# Patient Record
Sex: MALE | Race: WHITE | NOT HISPANIC OR LATINO | Employment: OTHER | URBAN - NONMETROPOLITAN AREA
[De-identification: names, ages, dates, MRNs, and addresses within clinical notes are randomized per-mention and may not be internally consistent; named-entity substitution may affect disease eponyms.]

---

## 2021-07-14 ENCOUNTER — OFFICE VISIT (OUTPATIENT)
Dept: CARDIOLOGY | Facility: CLINIC | Age: 71
End: 2021-07-14

## 2021-07-14 VITALS
SYSTOLIC BLOOD PRESSURE: 148 MMHG | HEIGHT: 73 IN | WEIGHT: 195 LBS | DIASTOLIC BLOOD PRESSURE: 88 MMHG | RESPIRATION RATE: 12 BRPM | HEART RATE: 88 BPM | BODY MASS INDEX: 25.84 KG/M2 | TEMPERATURE: 97 F | OXYGEN SATURATION: 99 %

## 2021-07-14 DIAGNOSIS — Z72.0 TOBACCO USE: ICD-10-CM

## 2021-07-14 DIAGNOSIS — I48.20 ATRIAL FIBRILLATION, CHRONIC (HCC): Primary | ICD-10-CM

## 2021-07-14 PROCEDURE — 99203 OFFICE O/P NEW LOW 30 MIN: CPT | Performed by: INTERNAL MEDICINE

## 2021-07-14 PROCEDURE — 93000 ELECTROCARDIOGRAM COMPLETE: CPT | Performed by: INTERNAL MEDICINE

## 2021-07-14 RX ORDER — METOPROLOL SUCCINATE 25 MG/1
25 TABLET, EXTENDED RELEASE ORAL DAILY
Qty: 90 TABLET | Refills: 3 | Status: SHIPPED | OUTPATIENT
Start: 2021-07-14 | End: 2022-07-25 | Stop reason: SDUPTHER

## 2021-07-14 NOTE — PROGRESS NOTES
[facility] [place]  [facility] CARDIOLOGY  [address] [place] [address]  Dept: [phone]  Dept Fax: [phone]    Ray Washington  1950    Follow Up Office Visit Note    History of Present Illness:  Ray Washington is a 70 y.o. male who presents to the clinic for new patient. Chronic atrial fibrillation- - He is 70 years old, with Chronic atrial fibrillation, on toprol xl 25mg and Xarelto 20 mg daily, denies any chest pain, SOB, no edema, no palpitations, BP is 130.80 EKG in AF with HR 68. ,, will keep same meds, his cardiac exam seems normal except irregular irregular, he smokes, few daily., advised to quit    The following portions of the patient's history were reviewed and updated as appropriate: allergies, current medications, past family history, past medical history, past social history, past surgical history and problem list.    Medications:  folic acid  metoprolol succinate XL  rivaroxaban  vitamin B-12    Subjective  No Known Allergies     Past Medical History:   Diagnosis Date   • Actinic keratosis    • Benign prostatic hyperplasia     UNSPECIFIED WHETHER LOWER URINARY TRACT SYMPTOMS PRESENT   • Body mass index (BMI) of 25.0 to 25.9 in adult    • Corneal abrasion    • History of basal cell carcinoma (BCC) excision    • Low back pain    • Mixed hyperlipidemia    • New onset a-fib (CMS/HCC)    • Primary osteoarthritis of left hip    • Right epiphora    • Tobacco abuse    • Watering of eye        Past Surgical History:   Procedure Laterality Date   • ARTHROPLASTY      LEFT TOTAL HIP   • COLONOSCOPY         Family History   Problem Relation Age of Onset   • Hypertension Mother    • Diabetes Father    • Hypertension Father    • Diabetes Sister    • Hypertension Brother    • Diabetes Brother         Social History     Socioeconomic History   • Marital status:      Spouse name: Not on file   • Number of children: Not on file   • Years of education: Not on file   • Highest  "education level: Not on file   Tobacco Use   • Smoking status: Smoker, Current Status Unknown     Packs/day: 0.25     Types: Cigarettes   • Smokeless tobacco: Never Used   Vaping Use   • Vaping Use: Never used   Substance and Sexual Activity   • Alcohol use: Yes   • Drug use: Never   • Sexual activity: Defer       Review of Systems   All other systems reviewed and are negative.      Cardiovascular Procedures    ECHO/MUGA:   STRESS TESTS:   CARDIAC CATH:   DEVICES:   HOLTER:   CT/MRI:   VASCULAR:   CARDIOTHORACIC:     Objective  Vitals:    07/14/21 1506   BP: 148/88   BP Location: Left arm   Patient Position: Sitting   Cuff Size: Adult   Pulse: 88   Resp: 12   Temp: 97 °F (36.1 °C)   TempSrc: Infrared   SpO2: 99%   Weight: 88.5 kg (195 lb)   Height: 185.4 cm (73\")   PainSc: 0-No pain     Body mass index is 25.73 kg/m².     Physical Exam  Constitutional:       Appearance: Healthy appearance. Not in distress.   Neck:      Vascular: No JVR. JVD normal.   Pulmonary:      Effort: Pulmonary effort is normal.      Breath sounds: Normal breath sounds. No wheezing. No rhonchi. No rales.   Chest:      Chest wall: Not tender to palpatation.   Cardiovascular:      PMI at left midclavicular line. Normal rate. Irregularly irregular rhythm. Normal S1. Normal S2.      Murmurs: There is no murmur.      No gallop. No click. No rub.   Pulses:     Intact distal pulses.   Edema:     Peripheral edema absent.   Abdominal:      General: Bowel sounds are normal.      Palpations: Abdomen is soft.      Tenderness: There is no abdominal tenderness.   Musculoskeletal: Normal range of motion.         General: No tenderness. Skin:     General: Skin is warm and dry.   Neurological:      General: No focal deficit present.      Mental Status: Alert and oriented to person, place and time.          Diagnostic Data    ECG 12 Lead    Date/Time: 7/14/2021 3:59 PM  Performed by: Rony Hernadez MD  Authorized by: Rony Hernadez MD "   Comparison: compared with previous ECG   Similar to previous ECG  Rhythm: atrial fibrillation  Rate: normal  BPM: 68  Conduction: left anterior fascicular block  QRS axis: left    Clinical impression: abnormal EKG            Assessment and Plan  Diagnoses and all orders for this visit:    Atrial fibrillation, chronic (CMS/HCC)-rate control, doing good on Toprol xl 25mg, and Xarelto 20 mg.  Tobacco abuser- Advised to quit smoking         Return in about 6 months (around 1/14/2022) for Recheck.    Rony Hernadez MD  07/14/2021

## 2022-01-25 ENCOUNTER — OFFICE VISIT (OUTPATIENT)
Dept: CARDIOLOGY | Facility: CLINIC | Age: 72
End: 2022-01-25

## 2022-01-25 VITALS
HEART RATE: 96 BPM | SYSTOLIC BLOOD PRESSURE: 132 MMHG | DIASTOLIC BLOOD PRESSURE: 80 MMHG | BODY MASS INDEX: 25.98 KG/M2 | OXYGEN SATURATION: 99 % | HEIGHT: 73 IN | WEIGHT: 196 LBS | TEMPERATURE: 97 F | RESPIRATION RATE: 12 BRPM

## 2022-01-25 DIAGNOSIS — I48.20 ATRIAL FIBRILLATION, CHRONIC: Primary | ICD-10-CM

## 2022-01-25 DIAGNOSIS — Z72.0 TOBACCO USE: ICD-10-CM

## 2022-01-25 PROCEDURE — 99213 OFFICE O/P EST LOW 20 MIN: CPT | Performed by: INTERNAL MEDICINE

## 2022-01-25 NOTE — PROGRESS NOTES
[facility] [place]  [facility] CARDIOLOGY  [address] [place] [address]  Dept: [phone]  Dept Fax: [phone]    Ray Washington  1950    Follow Up Office Visit Note    History of Present Illness:  Ray Washington is a 71 y.o. male who presents to the clinic for Follow-up.CAF- He seems doing well, no complaints, on Toprol xl 25 mg HR 80, and also Xarelto 20 mg, his creatinine last year was 1.3 will get some lab .    The following portions of the patient's history were reviewed and updated as appropriate: allergies, current medications, past family history, past medical history, past social history, past surgical history and problem list.    Medications:  folic acid  metoprolol succinate XL  rivaroxaban  vitamin B-12    Subjective  No Known Allergies     Past Medical History:   Diagnosis Date   • Actinic keratosis    • Benign prostatic hyperplasia     UNSPECIFIED WHETHER LOWER URINARY TRACT SYMPTOMS PRESENT   • Body mass index (BMI) of 25.0 to 25.9 in adult    • Corneal abrasion    • History of basal cell carcinoma (BCC) excision    • Low back pain    • Mixed hyperlipidemia    • New onset a-fib (HCC)    • Primary osteoarthritis of left hip    • Right epiphora    • Tobacco abuse    • Watering of eye        Past Surgical History:   Procedure Laterality Date   • ARTHROPLASTY      LEFT TOTAL HIP   • COLONOSCOPY         Family History   Problem Relation Age of Onset   • Hypertension Mother    • Diabetes Father    • Hypertension Father    • Diabetes Sister    • Hypertension Brother    • Diabetes Brother         Social History     Socioeconomic History   • Marital status:    Tobacco Use   • Smoking status: Smoker, Current Status Unknown     Packs/day: 0.25     Types: Cigarettes   • Smokeless tobacco: Never Used   Vaping Use   • Vaping Use: Never used   Substance and Sexual Activity   • Alcohol use: Yes   • Drug use: Never   • Sexual activity: Defer       Review of Systems  "  Constitutional: Negative.    HENT: Negative.    Respiratory: Negative.    Cardiovascular: Negative.    Endocrine: Negative.    Genitourinary: Negative.    Musculoskeletal: Negative.    Skin: Negative.    Allergic/Immunologic: Negative.    Neurological: Negative.    Hematological: Negative.    Psychiatric/Behavioral: Negative.    All other systems reviewed and are negative.      Cardiovascular Procedures    ECHO/MUGA:   STRESS TESTS:   CARDIAC CATH:   DEVICES:   HOLTER:   CT/MRI:   VASCULAR:   CARDIOTHORACIC:     Objective  Vitals:    01/25/22 1450   BP: 132/80   BP Location: Left arm   Patient Position: Lying   Cuff Size: Adult   Pulse: 96   Resp: 12   Temp: 97 °F (36.1 °C)   TempSrc: Infrared   SpO2: 99%   Weight: 88.9 kg (196 lb)   Height: 185.4 cm (73\")   PainSc: 0-No pain     Body mass index is 25.86 kg/m².     Physical Exam  Constitutional:       Appearance: Healthy appearance. Not in distress.   Neck:      Vascular: No JVR. JVD normal.   Pulmonary:      Effort: Pulmonary effort is normal.      Breath sounds: Normal breath sounds. No wheezing. No rhonchi. No rales.   Chest:      Chest wall: Not tender to palpatation.   Cardiovascular:      PMI at left midclavicular line. Normal rate. Irregularly irregular rhythm. Normal S1. Normal S2.      Murmurs: There is no murmur.      No gallop. No click. No rub.   Pulses:     Intact distal pulses.   Edema:     Peripheral edema absent.   Abdominal:      General: Bowel sounds are normal.      Palpations: Abdomen is soft.      Tenderness: There is no abdominal tenderness.   Musculoskeletal: Normal range of motion.         General: No tenderness. Skin:     General: Skin is warm and dry.   Neurological:      General: No focal deficit present.      Mental Status: Alert and oriented to person, place and time.          Diagnostic Data  Procedures    Assessment and Plan  Diagnoses and all orders for this visit:    Atrial fibrillation, chronic (HCC)- Rate control on Toprol xl 25 " mg, and also Xarelto 20 mg  -     CBC & Differential  -     Comprehensive Metabolic Panel    Tobacco use- advised to quit         Return in about 6 months (around 7/25/2022) for Recheck.    Rony Hernadez MD  01/25/2022

## 2022-01-25 NOTE — PROGRESS NOTES
"[facility] [place]  [facility] CARDIOLOGY  [address] [place] [address]  Dept: [phone]  Dept Fax: [phone]    Ray Washington  1950    Follow Up Office Visit Note    History of Present Illness:  Ray Washington is a 71 y.o. male who presents to the clinic for Follow-up Chronic atrial fibrillation, asymptomatic,     The following portions of the patient's history were reviewed and updated as appropriate: {history reviewed:20406::\"allergies\",\"current medications\",\"past family history\",\"past medical history\",\"past social history\",\"past surgical history\",\"problem list\"}.    Medications:  folic acid  metoprolol succinate XL  rivaroxaban  vitamin B-12    Subjective  No Known Allergies     Past Medical History:   Diagnosis Date   • Actinic keratosis    • Benign prostatic hyperplasia     UNSPECIFIED WHETHER LOWER URINARY TRACT SYMPTOMS PRESENT   • Body mass index (BMI) of 25.0 to 25.9 in adult    • Corneal abrasion    • History of basal cell carcinoma (BCC) excision    • Low back pain    • Mixed hyperlipidemia    • New onset a-fib (HCC)    • Primary osteoarthritis of left hip    • Right epiphora    • Tobacco abuse    • Watering of eye        Past Surgical History:   Procedure Laterality Date   • ARTHROPLASTY      LEFT TOTAL HIP   • COLONOSCOPY         Family History   Problem Relation Age of Onset   • Hypertension Mother    • Diabetes Father    • Hypertension Father    • Diabetes Sister    • Hypertension Brother    • Diabetes Brother         Social History     Socioeconomic History   • Marital status:    Tobacco Use   • Smoking status: Smoker, Current Status Unknown     Packs/day: 0.25     Types: Cigarettes   • Smokeless tobacco: Never Used   Vaping Use   • Vaping Use: Never used   Substance and Sexual Activity   • Alcohol use: Yes   • Drug use: Never   • Sexual activity: Defer       Review of Systems    Cardiovascular Procedures    ECHO/MUGA: ***  STRESS TESTS:   CARDIAC CATH: " "  DEVICES:   HOLTER:   CT/MRI:   VASCULAR:   CARDIOTHORACIC:     Objective  Vitals:    01/25/22 1450   BP: 132/80   BP Location: Left arm   Patient Position: Lying   Cuff Size: Adult   Pulse: 96   Resp: 12   Temp: 97 °F (36.1 °C)   TempSrc: Infrared   SpO2: 99%   Weight: 88.9 kg (196 lb)   Height: 185.4 cm (73\")   PainSc: 0-No pain     Body mass index is 25.86 kg/m².     Physical Exam  Physical Exam     Diagnostic Data  Procedures    Assessment and Plan  Diagnoses and all orders for this visit:    Atrial fibrillation, chronic (HCC)    Tobacco use         No follow-ups on file.    Rony Hernadez MD  01/25/2022  "

## 2022-01-26 ENCOUNTER — TELEPHONE (OUTPATIENT)
Dept: CARDIOLOGY | Facility: CLINIC | Age: 72
End: 2022-01-26

## 2022-01-26 LAB
ALBUMIN SERPL-MCNC: 4.1 G/DL (ref 3.7–4.7)
ALBUMIN/GLOB SERPL: 1.6 {RATIO} (ref 1.2–2.2)
ALP SERPL-CCNC: 51 IU/L (ref 44–121)
ALT SERPL-CCNC: 14 IU/L (ref 0–44)
AST SERPL-CCNC: 20 IU/L (ref 0–40)
BASOPHILS # BLD AUTO: 0 X10E3/UL (ref 0–0.2)
BASOPHILS NFR BLD AUTO: 0 %
BILIRUB SERPL-MCNC: 0.3 MG/DL (ref 0–1.2)
BUN SERPL-MCNC: 19 MG/DL (ref 8–27)
BUN/CREAT SERPL: 17 (ref 10–24)
CALCIUM SERPL-MCNC: 9 MG/DL (ref 8.6–10.2)
CHLORIDE SERPL-SCNC: 107 MMOL/L (ref 96–106)
CO2 SERPL-SCNC: 20 MMOL/L (ref 20–29)
CREAT SERPL-MCNC: 1.14 MG/DL (ref 0.76–1.27)
EOSINOPHIL # BLD AUTO: 0.3 X10E3/UL (ref 0–0.4)
EOSINOPHIL NFR BLD AUTO: 4 %
ERYTHROCYTE [DISTWIDTH] IN BLOOD BY AUTOMATED COUNT: 12 % (ref 11.6–15.4)
GLOBULIN SER CALC-MCNC: 2.5 G/DL (ref 1.5–4.5)
GLUCOSE SERPL-MCNC: 82 MG/DL (ref 65–99)
HCT VFR BLD AUTO: 43.4 % (ref 37.5–51)
HGB BLD-MCNC: 15.2 G/DL (ref 13–17.7)
IMM GRANULOCYTES # BLD AUTO: 0 X10E3/UL (ref 0–0.1)
IMM GRANULOCYTES NFR BLD AUTO: 0 %
LYMPHOCYTES # BLD AUTO: 1.9 X10E3/UL (ref 0.7–3.1)
LYMPHOCYTES NFR BLD AUTO: 28 %
MCH RBC QN AUTO: 32.8 PG (ref 26.6–33)
MCHC RBC AUTO-ENTMCNC: 35 G/DL (ref 31.5–35.7)
MCV RBC AUTO: 94 FL (ref 79–97)
MONOCYTES # BLD AUTO: 0.6 X10E3/UL (ref 0.1–0.9)
MONOCYTES NFR BLD AUTO: 8 %
NEUTROPHILS # BLD AUTO: 4.1 X10E3/UL (ref 1.4–7)
NEUTROPHILS NFR BLD AUTO: 60 %
PLATELET # BLD AUTO: 276 X10E3/UL (ref 150–450)
POTASSIUM SERPL-SCNC: 4.6 MMOL/L (ref 3.5–5.2)
PROT SERPL-MCNC: 6.6 G/DL (ref 6–8.5)
RBC # BLD AUTO: 4.63 X10E6/UL (ref 4.14–5.8)
SODIUM SERPL-SCNC: 140 MMOL/L (ref 134–144)
WBC # BLD AUTO: 6.8 X10E3/UL (ref 3.4–10.8)

## 2022-01-26 NOTE — TELEPHONE ENCOUNTER
----- Message from Rony Hernadez MD sent at 1/26/2022 12:48 PM EST -----  Lab are good including kidney function

## 2022-01-28 ENCOUNTER — TELEPHONE (OUTPATIENT)
Dept: CARDIOLOGY | Facility: CLINIC | Age: 72
End: 2022-01-28

## 2022-01-28 NOTE — TELEPHONE ENCOUNTER
Spoke with  Felix and advised him his lab work and kidney function were normal. He had no questions at this time and verbalized understanding.

## 2022-06-20 ENCOUNTER — OFFICE VISIT (OUTPATIENT)
Dept: FAMILY MEDICINE CLINIC | Facility: CLINIC | Age: 72
End: 2022-06-20

## 2022-06-20 VITALS
HEIGHT: 73 IN | SYSTOLIC BLOOD PRESSURE: 140 MMHG | OXYGEN SATURATION: 98 % | BODY MASS INDEX: 25.96 KG/M2 | DIASTOLIC BLOOD PRESSURE: 85 MMHG | WEIGHT: 195.9 LBS | HEART RATE: 66 BPM

## 2022-06-20 DIAGNOSIS — Z00.00 ROUTINE GENERAL MEDICAL EXAMINATION AT A HEALTH CARE FACILITY: Primary | ICD-10-CM

## 2022-06-20 DIAGNOSIS — I10 PRIMARY HYPERTENSION: ICD-10-CM

## 2022-06-20 DIAGNOSIS — I48.21 PERMANENT ATRIAL FIBRILLATION: ICD-10-CM

## 2022-06-20 DIAGNOSIS — E78.2 MIXED HYPERLIPIDEMIA: ICD-10-CM

## 2022-06-20 DIAGNOSIS — Z12.5 PROSTATE CANCER SCREENING: ICD-10-CM

## 2022-06-20 DIAGNOSIS — Z72.0 TOBACCO ABUSE: ICD-10-CM

## 2022-06-20 DIAGNOSIS — L57.0 ACTINIC KERATOSIS: ICD-10-CM

## 2022-06-20 PROCEDURE — 99397 PER PM REEVAL EST PAT 65+ YR: CPT | Performed by: FAMILY MEDICINE

## 2022-06-20 PROCEDURE — 99213 OFFICE O/P EST LOW 20 MIN: CPT | Performed by: FAMILY MEDICINE

## 2022-06-20 PROCEDURE — 36415 COLL VENOUS BLD VENIPUNCTURE: CPT | Performed by: FAMILY MEDICINE

## 2022-06-20 NOTE — PROGRESS NOTES
"Chief Complaint  Suspicious Skin Lesion and foot fungus    Subjective          Ray Washington presents to [facility] PRIMARY [facility]  Mr. Ray Washington comes in today he has some skin spots he wants to look at he wants his blood work done and states he would like to get his low-dose CT scan set up as well he denies any other diffuse problems at this point time he states he has quit smoking for some period of time and states otherwise he has been doing pretty good.      Objective   Vital Signs:   /85   Pulse 66   Ht 185.4 cm (73\")   Wt 88.9 kg (195 lb 14.4 oz)   SpO2 98%   BMI 25.85 kg/m²     Body mass index is 25.85 kg/m².    Review of Systems   Constitutional: Negative.    HENT: Negative for congestion, dental problem, ear discharge, ear pain and sore throat.    Respiratory: Positive for shortness of breath. Negative for apnea and chest tightness.    Gastrointestinal: Negative for constipation and nausea.   Endocrine: Negative for polyuria.   Genitourinary: Negative for difficulty urinating.   Musculoskeletal: Negative for arthralgias and gait problem.   Skin: Positive for skin lesions. Negative for rash.   Hematological: Negative for adenopathy.       Past History:  Medical History: has a past medical history of Actinic keratosis, Benign prostatic hyperplasia, Body mass index (BMI) of 25.0 to 25.9 in adult, Corneal abrasion, History of basal cell carcinoma (BCC) excision, Low back pain, Mixed hyperlipidemia, New onset a-fib (HCC), Primary osteoarthritis of left hip, Right epiphora, Tobacco abuse, and Watering of eye.   Surgical History: has a past surgical history that includes Arthroplasty and Colonoscopy.         Current Outpatient Medications:   •  metoprolol succinate XL (TOPROL-XL) 25 MG 24 hr tablet, Take 1 tablet by mouth Daily., Disp: 90 tablet, Rfl: 3  •  rivaroxaban (XARELTO) 20 MG tablet, Take 1 tablet by mouth Daily With Dinner., Disp: 90 tablet, Rfl: 3    Allergies: Patient has " no known allergies.    Physical Exam  Constitutional:       Appearance: Normal appearance.   HENT:      Head: Normocephalic.      Right Ear: Tympanic membrane, ear canal and external ear normal.      Left Ear: Tympanic membrane, ear canal and external ear normal.      Nose: Nose normal.      Mouth/Throat:      Pharynx: Oropharynx is clear.   Eyes:      Pupils: Pupils are equal, round, and reactive to light.   Cardiovascular:      Rate and Rhythm: Normal rate. Rhythm irregular.      Pulses: Normal pulses.   Pulmonary:      Effort: Pulmonary effort is normal.      Breath sounds: Normal breath sounds.   Abdominal:      General: Abdomen is flat. Bowel sounds are normal.      Palpations: Abdomen is soft.   Musculoskeletal:         General: Normal range of motion.   Skin:     General: Skin is warm and dry.      Comments: Patient has an actinic keratosis on the right side of his chest and also has 1 on his right shoulder at this time   Neurological:      General: No focal deficit present.      Mental Status: He is alert and oriented to person, place, and time.          Result Review :                   Assessment and Plan    Diagnoses and all orders for this visit:    1. Routine general medical examination at a health care facility (Primary)  Comments:  We will continue his present medications we will get a low-dose CT scan ordered as well    2. Primary hypertension  Comments:  Stable  Orders:  -     Comprehensive Metabolic Panel; Future  -     CBC & Differential; Future  -     Comprehensive Metabolic Panel  -     CBC & Differential    3. Actinic keratosis  Comments:  Treated to with liquid nitrogen    4. Mixed hyperlipidemia  Comments:  Blood work and monitor  Orders:  -     Lipid Panel; Future  -     Lipid Panel    5. Tobacco abuse  Comments:  As [id number] history we will get low-dose CT scan    6. Prostate cancer screening  Comments:  We will get PSA  Orders:  -     PSA Screen; Future  -     PSA Screen    7.  Permanent atrial fibrillation (HCC)  Comments:  Has been seeing cardiology but not recently            Updated annual wellness visit checklist.  Immunizations discussed.  Screening up-to-date.  Recommend yearly dental and eye exams. Also discussed monitoring of blood pressure and lipids.    Follow Up   No follow-ups on file.  Patient was given instructions and counseling regarding his condition or for health maintenance advice. Please see specific information pulled into the AVS if appropriate.     Brian Reese MD

## 2022-06-21 LAB
ALBUMIN SERPL-MCNC: 4.5 G/DL (ref 3.7–4.7)
ALBUMIN/GLOB SERPL: 1.7 {RATIO} (ref 1.2–2.2)
ALP SERPL-CCNC: 69 IU/L (ref 44–121)
ALT SERPL-CCNC: 14 IU/L (ref 0–44)
AST SERPL-CCNC: 20 IU/L (ref 0–40)
BASOPHILS # BLD AUTO: 0 X10E3/UL (ref 0–0.2)
BASOPHILS NFR BLD AUTO: 0 %
BILIRUB SERPL-MCNC: 0.3 MG/DL (ref 0–1.2)
BUN SERPL-MCNC: 15 MG/DL (ref 8–27)
BUN/CREAT SERPL: 12 (ref 10–24)
CALCIUM SERPL-MCNC: 9.8 MG/DL (ref 8.6–10.2)
CHLORIDE SERPL-SCNC: 103 MMOL/L (ref 96–106)
CHOLEST SERPL-MCNC: 197 MG/DL (ref 100–199)
CO2 SERPL-SCNC: 24 MMOL/L (ref 20–29)
CREAT SERPL-MCNC: 1.21 MG/DL (ref 0.76–1.27)
EGFRCR SERPLBLD CKD-EPI 2021: 63 ML/MIN/1.73
EOSINOPHIL # BLD AUTO: 0.3 X10E3/UL (ref 0–0.4)
EOSINOPHIL NFR BLD AUTO: 4 %
ERYTHROCYTE [DISTWIDTH] IN BLOOD BY AUTOMATED COUNT: 12.2 % (ref 11.6–15.4)
GLOBULIN SER CALC-MCNC: 2.7 G/DL (ref 1.5–4.5)
GLUCOSE SERPL-MCNC: 96 MG/DL (ref 65–99)
HCT VFR BLD AUTO: 45.1 % (ref 37.5–51)
HDLC SERPL-MCNC: 40 MG/DL
HGB BLD-MCNC: 15.4 G/DL (ref 13–17.7)
IMM GRANULOCYTES # BLD AUTO: 0 X10E3/UL (ref 0–0.1)
IMM GRANULOCYTES NFR BLD AUTO: 0 %
LDLC SERPL CALC-MCNC: 106 MG/DL (ref 0–99)
LYMPHOCYTES # BLD AUTO: 2.6 X10E3/UL (ref 0.7–3.1)
LYMPHOCYTES NFR BLD AUTO: 32 %
MCH RBC QN AUTO: 32.4 PG (ref 26.6–33)
MCHC RBC AUTO-ENTMCNC: 34.1 G/DL (ref 31.5–35.7)
MCV RBC AUTO: 95 FL (ref 79–97)
MONOCYTES # BLD AUTO: 0.7 X10E3/UL (ref 0.1–0.9)
MONOCYTES NFR BLD AUTO: 8 %
NEUTROPHILS # BLD AUTO: 4.4 X10E3/UL (ref 1.4–7)
NEUTROPHILS NFR BLD AUTO: 56 %
PLATELET # BLD AUTO: 278 X10E3/UL (ref 150–450)
POTASSIUM SERPL-SCNC: 4.6 MMOL/L (ref 3.5–5.2)
PROT SERPL-MCNC: 7.2 G/DL (ref 6–8.5)
PSA SERPL-MCNC: 0.7 NG/ML (ref 0–4)
RBC # BLD AUTO: 4.76 X10E6/UL (ref 4.14–5.8)
SODIUM SERPL-SCNC: 140 MMOL/L (ref 134–144)
TRIGL SERPL-MCNC: 296 MG/DL (ref 0–149)
VLDLC SERPL CALC-MCNC: 51 MG/DL (ref 5–40)
WBC # BLD AUTO: 8 X10E3/UL (ref 3.4–10.8)

## 2022-06-21 NOTE — PROGRESS NOTES
Triglycerides were little bit up so watch the fried foods everything else was actually normal repeat in 6 months

## 2022-07-08 ENCOUNTER — TELEPHONE (OUTPATIENT)
Dept: FAMILY MEDICINE CLINIC | Facility: CLINIC | Age: 72
End: 2022-07-08

## 2022-07-08 NOTE — TELEPHONE ENCOUNTER
----- Message from Brian Reese MD sent at 6/21/2022  9:45 AM EDT -----  Triglycerides were little bit up so watch the fried foods everything else was actually normal repeat in 6 months

## 2022-07-25 ENCOUNTER — OFFICE VISIT (OUTPATIENT)
Dept: CARDIOLOGY | Facility: CLINIC | Age: 72
End: 2022-07-25

## 2022-07-25 VITALS
OXYGEN SATURATION: 98 % | HEART RATE: 73 BPM | TEMPERATURE: 97.3 F | SYSTOLIC BLOOD PRESSURE: 128 MMHG | WEIGHT: 194 LBS | RESPIRATION RATE: 16 BRPM | BODY MASS INDEX: 25.71 KG/M2 | HEIGHT: 73 IN | DIASTOLIC BLOOD PRESSURE: 82 MMHG

## 2022-07-25 DIAGNOSIS — Z72.0 TOBACCO USE: ICD-10-CM

## 2022-07-25 DIAGNOSIS — E78.00 HYPERCHOLESTEROLEMIA: ICD-10-CM

## 2022-07-25 DIAGNOSIS — I48.20 ATRIAL FIBRILLATION, CHRONIC: Primary | ICD-10-CM

## 2022-07-25 PROCEDURE — 99214 OFFICE O/P EST MOD 30 MIN: CPT | Performed by: INTERNAL MEDICINE

## 2022-07-25 PROCEDURE — 93000 ELECTROCARDIOGRAM COMPLETE: CPT | Performed by: INTERNAL MEDICINE

## 2022-07-25 RX ORDER — METOPROLOL SUCCINATE 25 MG/1
25 TABLET, EXTENDED RELEASE ORAL DAILY
Qty: 90 TABLET | Refills: 3 | Status: SHIPPED | OUTPATIENT
Start: 2022-07-25

## 2022-07-25 RX ORDER — ROSUVASTATIN CALCIUM 10 MG/1
10 TABLET, COATED ORAL DAILY
Qty: 90 TABLET | Refills: 3 | Status: SHIPPED | OUTPATIENT
Start: 2022-07-25 | End: 2023-01-25 | Stop reason: SDDI

## 2022-07-25 NOTE — PROGRESS NOTES
[facility] [place]  [facility] CARDIOLOGY  [address] [place] [address]  Dept: [phone]  Dept Fax: [phone]    Ray Washington  1950    Follow Up Office Visit Note    History of Present Illness:  Ray Washington is a 71 y.o. male who presents to the clinic for Follow-up. CAF- The patient seems doing well, no edema no SOB, no palpitations, on toprol xl 25mg and also Xarelto 20 mg, EKG AF HR 83    The following portions of the patient's history were reviewed and updated as appropriate: allergies, current medications, past family history, past medical history, past social history, past surgical history and problem list.    Medications:  metoprolol succinate XL  rivaroxaban  rosuvastatin    Subjective  No Known Allergies     Past Medical History:   Diagnosis Date   • Actinic keratosis    • Benign prostatic hyperplasia     UNSPECIFIED WHETHER LOWER URINARY TRACT SYMPTOMS PRESENT   • Body mass index (BMI) of 25.0 to 25.9 in adult    • Corneal abrasion    • History of basal cell carcinoma (BCC) excision    • Low back pain    • Mixed hyperlipidemia    • New onset a-fib (HCC)    • Primary osteoarthritis of left hip    • Right epiphora    • Tobacco abuse    • Watering of eye        Past Surgical History:   Procedure Laterality Date   • ARTHROPLASTY      LEFT TOTAL HIP   • COLONOSCOPY         Family History   Problem Relation Age of Onset   • Hypertension Mother    • Diabetes Father    • Hypertension Father    • Diabetes Sister    • Hypertension Brother    • Diabetes Brother         Social History     Socioeconomic History   • Marital status:    Tobacco Use   • Smoking status: Smoker, Current Status Unknown     Packs/day: 0.25     Types: Cigarettes   • Smokeless tobacco: Never Used   Vaping Use   • Vaping Use: Never used   Substance and Sexual Activity   • Alcohol use: Yes   • Drug use: Never   • Sexual activity: Defer       Review of Systems   Constitutional: Negative.    HENT:  "Negative.    Respiratory: Negative.    Cardiovascular: Negative.    Endocrine: Negative.    Genitourinary: Negative.    Musculoskeletal: Negative.    Skin: Negative.    Allergic/Immunologic: Negative.    Neurological: Negative.    Hematological: Negative.    Psychiatric/Behavioral: Negative.        Cardiovascular Procedures    ECHO/MUGA:   STRESS TESTS:   CARDIAC CATH:   DEVICES:   HOLTER:   CT/MRI:   VASCULAR:   CARDIOTHORACIC:     Objective  Vitals:    07/25/22 1401   BP: 128/82   BP Location: Left arm   Patient Position: Lying   Cuff Size: Adult   Pulse: 73   Resp: 16   Temp: 97.3 °F (36.3 °C)   TempSrc: Temporal   SpO2: 98%   Weight: 88 kg (194 lb)   Height: 185.4 cm (73\")   PainSc: 0-No pain     Body mass index is 25.6 kg/m².     Physical Exam  Constitutional:       Appearance: Healthy appearance. Not in distress.   Neck:      Vascular: No JVR. JVD normal.   Pulmonary:      Effort: Pulmonary effort is normal.      Breath sounds: Normal breath sounds. No wheezing. No rhonchi. No rales.   Chest:      Chest wall: Not tender to palpatation.   Cardiovascular:      PMI at left midclavicular line. Normal rate. Irregularly irregular rhythm. Normal S1. Normal S2.      Murmurs: There is no murmur.      No gallop. No click. No rub.   Pulses:     Intact distal pulses.   Edema:     Peripheral edema absent.   Abdominal:      General: Bowel sounds are normal.      Palpations: Abdomen is soft.      Tenderness: There is no abdominal tenderness.   Musculoskeletal: Normal range of motion.         General: No tenderness. Skin:     General: Skin is warm and dry.   Neurological:      General: No focal deficit present.      Mental Status: Alert and oriented to person, place and time.          Diagnostic Data    ECG 12 Lead    Date/Time: 7/25/2022 2:25 PM  Performed by: Rony Hernadez MD  Authorized by: Rony Hernadez MD   Comparison: compared with previous ECG from 7/14/2021  Similar to previous ECG  Rhythm: atrial " fibrillation  Rate: normal  BPM: 83  QRS axis: left    Clinical impression: abnormal EKG            Assessment and Plan  Diagnoses and all orders for this visit:    Atrial fibrillation, chronic (HCC)- rate control, on Toprol xl 25 mg and also Xarelto 20 mg    Tobacco use- He is about to quit    Hypercholesterolemia- LDL and Try are elevated, he is over 68 , male, will use Crestor 10 mg    Other orders  -     rosuvastatin (CRESTOR) 10 MG tablet; Take 1 tablet by mouth Daily.  -     rivaroxaban (XARELTO) 20 MG tablet; Take 1 tablet by mouth Daily With Dinner.  -     metoprolol succinate XL (TOPROL-XL) 25 MG 24 hr tablet; Take 1 tablet by mouth Daily.         Return in about 6 months (around 1/25/2023) for Recheck.    Rony Hernadez MD  07/25/2022

## 2022-07-26 ENCOUNTER — TELEPHONE (OUTPATIENT)
Dept: FAMILY MEDICINE CLINIC | Facility: CLINIC | Age: 72
End: 2022-07-26

## 2022-08-30 ENCOUNTER — TELEPHONE (OUTPATIENT)
Dept: FAMILY MEDICINE CLINIC | Facility: CLINIC | Age: 72
End: 2022-08-30

## 2022-08-30 NOTE — TELEPHONE ENCOUNTER
Caller: LUIS ANGEL MANCINI     Relationship: [employer]    Best call back number: [phone]    What is your medical concern?  LUIS ANGEL IS CALLING TO SEE IF BLADIMIR HAD A PSA TEST OR CHEST XRAY IN 2021?  SHE NEEDS TO KNOW FOR THEIR WELLNESS COVERAGE.

## 2022-11-23 ENCOUNTER — TELEPHONE (OUTPATIENT)
Dept: FAMILY MEDICINE CLINIC | Facility: CLINIC | Age: 72
End: 2022-11-23

## 2022-11-23 NOTE — TELEPHONE ENCOUNTER
Provider: ANA     Caller: LUIS ANGEL MANCINI     Relationship to Patient: [employer]    Phone Number: [phone]    Reason for Call: PATIENT IS ASKING FOR 3 BOTTLES OF ZARELTO SAMPLES.  WITH INSURANCE MEDICATION IS $400+

## 2023-01-25 ENCOUNTER — OFFICE VISIT (OUTPATIENT)
Dept: CARDIOLOGY | Facility: CLINIC | Age: 73
End: 2023-01-25
Payer: MEDICARE

## 2023-01-25 VITALS
HEART RATE: 94 BPM | RESPIRATION RATE: 12 BRPM | HEIGHT: 73 IN | SYSTOLIC BLOOD PRESSURE: 130 MMHG | WEIGHT: 193 LBS | TEMPERATURE: 98 F | DIASTOLIC BLOOD PRESSURE: 72 MMHG | BODY MASS INDEX: 25.58 KG/M2 | OXYGEN SATURATION: 99 %

## 2023-01-25 DIAGNOSIS — I48.20 ATRIAL FIBRILLATION, CHRONIC: Primary | ICD-10-CM

## 2023-01-25 DIAGNOSIS — E78.00 HYPERCHOLESTEROLEMIA: ICD-10-CM

## 2023-01-25 DIAGNOSIS — Z72.0 TOBACCO USE: ICD-10-CM

## 2023-01-25 PROCEDURE — 99213 OFFICE O/P EST LOW 20 MIN: CPT

## 2023-01-25 NOTE — PROGRESS NOTES
[facility] [place]  [facility] CARDIOLOGY  [address] [place] [address]  Dept: [phone]  Dept Fax: [phone]    Ray Washington  1950    Follow Up Office Visit Note    History of Present Illness:  Ray Washington is a 72 y.o. male who presents to the clinic for Establish Care and Atrial Fibrillation. He denies all complaints today, doing well, no CP, SOB, dizziness, palpitations, LE edema. Chronic atrial fibrillation on Toprol 25 qd and Xarelto 20, denies bleeding. HLP was prescribed Crestor but he states he did not  from pharmacy, he does not want to be on this at this time. ASCVD 10 yr score 24.9%, statin is indicated. Advised we will get another Lipid. PE seems normal, atrial fibrillation today. At this time discussed nutrition guidance, high triglycerides, he did drink at the time that was checked June 2022 but states no longer at this time. Further recommendation pending lab findings.     The following portions of the patient's history were reviewed and updated as appropriate: allergies, current medications, past family history, past medical history, past social history, past surgical history, and problem list.    Medications:  metoprolol succinate XL  rivaroxaban    Subjective  No Known Allergies     Past Medical History:   Diagnosis Date   • Actinic keratosis    • Benign prostatic hyperplasia     UNSPECIFIED WHETHER LOWER URINARY TRACT SYMPTOMS PRESENT   • Body mass index (BMI) of 25.0 to 25.9 in adult    • Corneal abrasion    • History of basal cell carcinoma (BCC) excision    • Low back pain    • Mixed hyperlipidemia    • New onset a-fib (HCC)    • Primary osteoarthritis of left hip    • Right epiphora    • Tobacco abuse    • Watering of eye        Past Surgical History:   Procedure Laterality Date   • ARTHROPLASTY      LEFT TOTAL HIP   • COLONOSCOPY         Family History   Problem Relation Age of Onset   • Hypertension Mother    • Diabetes Father    •  "Hypertension Father    • Diabetes Sister    • Hypertension Brother    • Diabetes Brother         Social History     Socioeconomic History   • Marital status:    • Number of children: 1   • Highest education level: 12th grade   Tobacco Use   • Smoking status: Every Day     Packs/day: 0.25     Years: 40.00     Pack years: 10.00     Types: Cigarettes   • Smokeless tobacco: Never   Vaping Use   • Vaping Use: Never used   Substance and Sexual Activity   • Alcohol use: Yes   • Drug use: Never   • Sexual activity: Defer       Review of Systems   All other systems reviewed and are negative.      Cardiovascular Procedures    ECHO/MUGA:   STRESS TESTS:   CARDIAC CATH:   DEVICES:   HOLTER:   CT/MRI:   VASCULAR:   CARDIOTHORACIC:     Objective  Vitals:    01/25/23 1402   BP: 130/72   BP Location: Right arm   Patient Position: Sitting   Cuff Size: Adult   Pulse: 94   Resp: 12   Temp: 98 °F (36.7 °C)   TempSrc: Infrared   SpO2: 99%   Weight: 87.5 kg (193 lb)   Height: 185.4 cm (73\")   PainSc: 0-No pain     Body mass index is 25.46 kg/m².     Physical Exam  Vitals reviewed.   Constitutional:       Appearance: Healthy appearance. Not in distress.   Neck:      Vascular: No JVR. JVD normal.   Pulmonary:      Effort: Pulmonary effort is normal.      Breath sounds: Normal breath sounds. No wheezing. No rhonchi. No rales.   Chest:      Chest wall: Not tender to palpatation.   Cardiovascular:      PMI at left midclavicular line. Normal rate. Irregularly irregular rhythm. Normal S1. Normal S2.      Murmurs: There is no murmur.      No gallop. No click. No rub.   Pulses:     Intact distal pulses.   Edema:     Peripheral edema absent.   Abdominal:      General: Bowel sounds are normal.      Palpations: Abdomen is soft.      Tenderness: There is no abdominal tenderness.   Musculoskeletal: Normal range of motion.         General: No tenderness. Skin:     General: Skin is warm and dry.   Neurological:      General: No focal deficit " present.      Mental Status: Alert and oriented to person, place and time.          Diagnostic Data  Procedures    Assessment and Plan  Diagnoses and all orders for this visit:    1. Atrial fibrillation, chronic (HCC) (Primary)  Chronic rate control on Toprol 25 qd, and Xarelto 20 mg qd, denies bleeding. Continue therapy.     Samples give at patient request, 1 month supply Xarelto 20, LOT [id number], EXP 04-25    2. Hypercholesterolemia  Was prescribed Crestor, states he did not  because has been hesitant to start. Will get Lipid. ASCVD 10 yr score 24.9%, statin therapy is recommended. Nutritional guidance.   -     Lipid Panel; Future  -     CK; Future  -     Comprehensive Metabolic Panel; Future    3. Tobacco use  Still smoking < 1ppd x 40+ yrs.        Return in about 6 months (around 7/25/2023) for Recheck, Dr. Hernadez.    Cassia Kamara, APRN  01/25/2023

## 2023-07-25 ENCOUNTER — OFFICE VISIT (OUTPATIENT)
Dept: CARDIOLOGY | Facility: CLINIC | Age: 73
End: 2023-07-25
Payer: MEDICARE

## 2023-07-25 VITALS
DIASTOLIC BLOOD PRESSURE: 74 MMHG | OXYGEN SATURATION: 98 % | RESPIRATION RATE: 16 BRPM | WEIGHT: 187 LBS | HEART RATE: 76 BPM | BODY MASS INDEX: 24.78 KG/M2 | TEMPERATURE: 98 F | SYSTOLIC BLOOD PRESSURE: 136 MMHG | HEIGHT: 73 IN

## 2023-07-25 DIAGNOSIS — E78.00 HYPERCHOLESTEROLEMIA: ICD-10-CM

## 2023-07-25 DIAGNOSIS — I48.20 ATRIAL FIBRILLATION, CHRONIC: Primary | ICD-10-CM

## 2023-07-25 PROBLEM — Z72.0 TOBACCO USE: Status: RESOLVED | Noted: 2021-07-14 | Resolved: 2023-07-25

## 2023-07-25 PROCEDURE — 99214 OFFICE O/P EST MOD 30 MIN: CPT | Performed by: INTERNAL MEDICINE

## 2023-07-25 PROCEDURE — 93000 ELECTROCARDIOGRAM COMPLETE: CPT | Performed by: INTERNAL MEDICINE

## 2023-07-25 PROCEDURE — 1159F MED LIST DOCD IN RCRD: CPT | Performed by: INTERNAL MEDICINE

## 2023-07-25 PROCEDURE — 1160F RVW MEDS BY RX/DR IN RCRD: CPT | Performed by: INTERNAL MEDICINE

## 2023-07-25 RX ORDER — ROSUVASTATIN CALCIUM 5 MG/1
5 TABLET, COATED ORAL DAILY
Qty: 90 TABLET | Refills: 3 | Status: SHIPPED | OUTPATIENT
Start: 2023-07-25 | End: 2023-07-25 | Stop reason: SDUPTHER

## 2023-07-25 RX ORDER — ROSUVASTATIN CALCIUM 5 MG/1
5 TABLET, COATED ORAL DAILY
Qty: 90 TABLET | Refills: 3 | Status: SHIPPED | OUTPATIENT
Start: 2023-07-25

## 2023-07-25 RX ORDER — METOPROLOL SUCCINATE 25 MG/1
25 TABLET, EXTENDED RELEASE ORAL DAILY
Qty: 90 TABLET | Refills: 3 | Status: SHIPPED | OUTPATIENT
Start: 2023-07-25

## 2023-07-25 NOTE — PROGRESS NOTES
[facility] [place]  [facility] CARDIOLOGY  [address] [place] [address]  Dept: [phone]  Dept Fax: [phone]    Ray Washington  1950    Follow Up Office Visit Note    History of Present Illness:  Ray Washington is a 72 y.o. male who presents to the clinic for Follow-up. CAF- He is asymptomatic on Xarelto 20 mg, and also Toprol xl 25 mg Hr is 72, will keep same approach    The following portions of the patient's history were reviewed and updated as appropriate: allergies, current medications, past family history, past medical history, past social history, past surgical history, and problem list.    Medications:  metoprolol succinate XL  rivaroxaban  rosuvastatin    Subjective  No Known Allergies     Past Medical History:   Diagnosis Date    Actinic keratosis     Benign prostatic hyperplasia     UNSPECIFIED WHETHER LOWER URINARY TRACT SYMPTOMS PRESENT    Body mass index (BMI) of 25.0 to 25.9 in adult     Corneal abrasion     History of basal cell carcinoma (BCC) excision     Low back pain     Mixed hyperlipidemia     New onset a-fib     Primary osteoarthritis of left hip     Right epiphora     Tobacco abuse     Watering of eye        Past Surgical History:   Procedure Laterality Date    ARTHROPLASTY      LEFT TOTAL HIP    COLONOSCOPY         Family History   Problem Relation Age of Onset    Hypertension Mother     Diabetes Father     Hypertension Father     Diabetes Sister     Hypertension Brother     Diabetes Brother         Social History     Socioeconomic History    Marital status:     Number of children: 1    Highest education level: 12th grade   Tobacco Use    Smoking status: Every Day     Packs/day: 0.25     Years: 40.00     Pack years: 10.00     Types: Cigarettes    Smokeless tobacco: Never   Vaping Use    Vaping Use: Never used   Substance and Sexual Activity    Alcohol use: Yes    Drug use: Never    Sexual activity: Defer       Review of Systems   Constitutional:  "Negative.    HENT: Negative.     Respiratory: Negative.     Cardiovascular: Negative.    Endocrine: Negative.    Genitourinary: Negative.    Musculoskeletal: Negative.    Skin: Negative.    Allergic/Immunologic: Negative.    Neurological: Negative.    Hematological: Negative.    Psychiatric/Behavioral: Negative.       Cardiovascular Procedures    ECHO/MUGA:  STRESS TESTS:   CARDIAC CATH:   DEVICES:   HOLTER:   CT/MRI:   VASCULAR:   CARDIOTHORACIC:     Objective  Vitals:    07/25/23 1318   BP: 136/74   BP Location: Right arm   Patient Position: Lying   Cuff Size: Adult   Pulse: 76   Resp: 16   Temp: 98 °F (36.7 °C)   TempSrc: Infrared   SpO2: 98%   Weight: 84.8 kg (187 lb)   Height: 185.4 cm (73\")   PainSc: 0-No pain     Body mass index is 24.67 kg/m².     Physical Exam  Vitals reviewed.   Constitutional:       Appearance: Healthy appearance. Not in distress.   Eyes:      Pupils: Pupils are equal, round, and reactive to light.   HENT:    Mouth/Throat:      Pharynx: Oropharynx is clear.   Neck:      Thyroid: Thyroid normal.      Vascular: No JVR. JVD normal.   Pulmonary:      Effort: Pulmonary effort is normal.      Breath sounds: Normal breath sounds. No wheezing. No rhonchi. No rales.   Chest:      Chest wall: Not tender to palpatation.   Cardiovascular:      PMI at left midclavicular line. Normal rate. Irregularly irregular rhythm. Normal S1. Normal S2.       Murmurs: There is no murmur.      No gallop.  No click. No rub.   Pulses:     Intact distal pulses.      Carotid: 3+ bilaterally.     Radial: 3+ bilaterally.     Femoral: 3+ bilaterally.     Dorsalis pedis: 3+ bilaterally.     Posterior tibial: 3+ bilaterally.  Edema:     Peripheral edema absent.   Abdominal:      General: Bowel sounds are normal.      Palpations: Abdomen is soft.      Tenderness: There is no abdominal tenderness.   Musculoskeletal: Normal range of motion.         General: No tenderness.      Cervical back: Normal range of motion and neck " supple. Skin:     General: Skin is warm and dry.   Neurological:      General: No focal deficit present.      Mental Status: Alert and oriented to person, place and time.        Diagnostic Data    ECG 12 Lead    Date/Time: 7/25/2023 1:45 PM  Performed by: Rony Hernadez MD  Authorized by: Rony Hernadez MD   Comparison: compared with previous ECG from 7/25/2022  Similar to previous ECG  Rhythm: atrial fibrillation  Rate: normal  BPM: 72  Conduction: left anterior fascicular block  QRS axis: left    Clinical impression: abnormal EKG        Assessment and Plan  Diagnoses and all orders for this visit:  Atrial fibrillation, chronic- rate control on Toprol xl 25 mg and also Xarelto 20 mg,     Hypercholesterolemia- He has stop taking the Crestor, will restart, he at risk of vascular disease, he just quit smoking    Other orders  -     Discontinue: rosuvastatin (CRESTOR) 5 MG tablet; Take 1 tablet by mouth Daily.  -     metoprolol succinate XL (TOPROL-XL) 25 MG 24 hr tablet; Take 1 tablet by mouth Daily.  -     rivaroxaban (XARELTO) 20 MG tablet; Take 1 tablet by mouth Daily With Dinner.  -     rosuvastatin (CRESTOR) 5 MG tablet; Take 1 tablet by mouth Daily.         Return in about 6 months (around 1/25/2024) for Recheck with Dr. Hernadez.    Rony Hernadez MD  07/25/2023

## 2023-08-24 ENCOUNTER — TELEPHONE (OUTPATIENT)
Dept: FAMILY MEDICINE CLINIC | Facility: CLINIC | Age: 73
End: 2023-08-24
Payer: MEDICARE

## 2023-08-24 NOTE — TELEPHONE ENCOUNTER
Caller: LUIS ANGEL MANCINI    Relationship: Emergency Contact    Best call back number:       [phone] (Home)     What is the best time to reach you:     ANY TIME    Who are you requesting to speak with (clinical staff, provider,  specific staff member):         What was the call regarding:     CALLER REQUESTED A CALL BACK WITH DATE OF LAST PSA TEST PATIENT HAD COMPLETED    CALLER IS NOT LISTED ON [facility] VERBAL

## 2023-08-24 NOTE — TELEPHONE ENCOUNTER
Patient's wife called inquired about his last PSA test which was 6/20/2022.  Patient is coming in for appt with Dr Reese on 9/14/2022 at 2:15 pm  They would like a PSA lab order put in for his upcoming appointment.  Thank you.

## 2023-09-14 ENCOUNTER — OFFICE VISIT (OUTPATIENT)
Dept: FAMILY MEDICINE CLINIC | Facility: CLINIC | Age: 73
End: 2023-09-14
Payer: MEDICARE

## 2023-09-14 VITALS
WEIGHT: 189 LBS | SYSTOLIC BLOOD PRESSURE: 132 MMHG | OXYGEN SATURATION: 98 % | DIASTOLIC BLOOD PRESSURE: 82 MMHG | HEIGHT: 73 IN | TEMPERATURE: 97.5 F | BODY MASS INDEX: 25.05 KG/M2 | HEART RATE: 98 BPM

## 2023-09-14 DIAGNOSIS — Q82.9 SKIN ANOMALY: Primary | ICD-10-CM

## 2023-09-14 NOTE — PROGRESS NOTES
"Chief Complaint  Actinic Keratosis (On chest)    Subjective          Ray Washington presents to [facility] PRIMARY [facility]ARE  History of Present Illness  Patient has had a abnormal skin spot on his right chest has been treated with liquid nitrogen appear to be actinic keratosis he comes back now to recheck on it after period of time it appears now that he has a central area of necrosis with raised borders at this time it appears to be advanced he says its been itching and irritating him    Objective   Vital Signs:   /82   Pulse 98   Temp 97.5 °F (36.4 °C) (Infrared)   Ht 185.4 cm (73\")   Wt 85.7 kg (189 lb)   SpO2 98%   BMI 24.94 kg/m²     Body mass index is 24.94 kg/m².    Review of Systems   Constitutional: Negative.    HENT:  Negative for congestion, dental problem, ear discharge, ear pain and sore throat.    Respiratory:  Negative for apnea, chest tightness and shortness of breath.    Gastrointestinal:  Negative for constipation and nausea.   Endocrine: Negative for polyuria.   Genitourinary:  Negative for difficulty urinating.   Musculoskeletal:  Negative for arthralgias and gait problem.   Skin:  Positive for skin lesions. Negative for rash.   Hematological:  Negative for adenopathy.     Past History:  Medical History: has a past medical history of Actinic keratosis, Benign prostatic hyperplasia, Body mass index (BMI) of 25.0 to 25.9 in adult, Corneal abrasion, History of basal cell carcinoma (BCC) excision, Low back pain, Mixed hyperlipidemia, New onset a-fib, Primary osteoarthritis of left hip, Right epiphora, Tobacco abuse, and Watering of eye.   Surgical History: has a past surgical history that includes Arthroplasty and Colonoscopy.         Current Outpatient Medications:     metoprolol succinate XL (TOPROL-XL) 25 MG 24 hr tablet, Take 1 tablet by mouth Daily., Disp: 90 tablet, Rfl: 3    rivaroxaban (XARELTO) 20 MG tablet, Take 1 tablet by mouth Daily With Dinner., Disp: 90 tablet, " Rfl: 3    rosuvastatin (CRESTOR) 5 MG tablet, Take 1 tablet by mouth Daily., Disp: 90 tablet, Rfl: 3    Allergies: Patient has no known allergies.    Physical Exam  Vitals reviewed.   Constitutional:       Appearance: Normal appearance.   HENT:      Head: Normocephalic.      Right Ear: Tympanic membrane, ear canal and external ear normal.      Left Ear: Tympanic membrane, ear canal and external ear normal.      Nose: Nose normal.      Mouth/Throat:      Pharynx: Oropharynx is clear.   Eyes:      Pupils: Pupils are equal, round, and reactive to light.   Cardiovascular:      Rate and Rhythm: Normal rate and regular rhythm.      Pulses: Normal pulses.   Pulmonary:      Effort: Pulmonary effort is normal.      Breath sounds: Normal breath sounds.   Abdominal:      General: Abdomen is flat. Bowel sounds are normal.      Palpations: Abdomen is soft.   Musculoskeletal:         General: Normal range of motion.   Skin:     General: Skin is warm and dry.   Neurological:      General: No focal deficit present.      Mental Status: He is alert and oriented to person, place, and time.        Result Review :                   Assessment and Plan    Diagnoses and all orders for this visit:    1. Skin anomaly (Primary)  Comments:  Appears to have an area on his right chest that appears to be now probably basal cell we will schedule surgery to have it removed              Follow Up   No follow-ups on file.  Patient was given instructions and counseling regarding his condition or for health maintenance advice. Please see specific information pulled into the AVS if appropriate.     Brian Reese MD

## 2023-09-18 ENCOUNTER — TELEPHONE (OUTPATIENT)
Dept: FAMILY MEDICINE CLINIC | Facility: CLINIC | Age: 73
End: 2023-09-18

## 2023-09-18 NOTE — TELEPHONE ENCOUNTER
Caller: LUIS ANGEL MANCINI    Relationship: Emergency Contact    Best call back number: [phone]     What is the medical concern/diagnosis: PLACE ON CHEST THAT MAY BE CANCEROUS.    What specialty or service is being requested: VASCULAR SURGERY    What is the provider, practice or medical service name:     What is the office location:     What is the office phone number:     Any additional details: PATIENT'S WIFE IS CHECKING ON THE REFERRAL FOR THE ABOVE ISSUE.    ALSO, SHE IS CONCERNED ABOUT HIS ALCOHOL INTAKE AND ALSO HE WAS TO HAVE LABS DONE WHEN HE SAW DR PEREZ, BUT NONE WERE PUT IN THE SYSTEM AND SHE FEARS THAT SHE CANNOT GET HIM TO COME BACK IN FOR THOSE.  CAN SOMEONE CALL HER BACK TO TALK ABOUT THESE ISSUES.

## 2024-01-25 ENCOUNTER — OFFICE VISIT (OUTPATIENT)
Dept: CARDIOLOGY | Facility: CLINIC | Age: 74
End: 2024-01-25
Payer: MEDICARE

## 2024-01-25 VITALS
BODY MASS INDEX: 25.98 KG/M2 | WEIGHT: 196 LBS | TEMPERATURE: 98 F | HEIGHT: 73 IN | HEART RATE: 68 BPM | RESPIRATION RATE: 16 BRPM | OXYGEN SATURATION: 99 % | SYSTOLIC BLOOD PRESSURE: 130 MMHG | DIASTOLIC BLOOD PRESSURE: 70 MMHG

## 2024-01-25 DIAGNOSIS — I48.20 ATRIAL FIBRILLATION, CHRONIC: Primary | ICD-10-CM

## 2024-01-25 DIAGNOSIS — Z72.0 TOBACCO USE: ICD-10-CM

## 2024-01-25 DIAGNOSIS — E78.00 HYPERCHOLESTEROLEMIA: ICD-10-CM

## 2024-01-25 PROCEDURE — 99214 OFFICE O/P EST MOD 30 MIN: CPT | Performed by: INTERNAL MEDICINE

## 2024-01-25 PROCEDURE — 1159F MED LIST DOCD IN RCRD: CPT | Performed by: INTERNAL MEDICINE

## 2024-01-25 PROCEDURE — 1160F RVW MEDS BY RX/DR IN RCRD: CPT | Performed by: INTERNAL MEDICINE

## 2024-01-25 RX ORDER — ROSUVASTATIN CALCIUM 5 MG/1
5 TABLET, COATED ORAL DAILY
Qty: 90 TABLET | Refills: 3 | Status: SHIPPED | OUTPATIENT
Start: 2024-01-25

## 2024-01-25 RX ORDER — METOPROLOL SUCCINATE 25 MG/1
25 TABLET, EXTENDED RELEASE ORAL DAILY
Qty: 90 TABLET | Refills: 3 | Status: SHIPPED | OUTPATIENT
Start: 2024-01-25

## 2024-01-25 NOTE — PROGRESS NOTES
[facility] [place]  [facility] CARDIOLOGY  [address] [place] [address]  Dept: [phone]  Dept Fax: [phone]    Ray Washington  1950    Follow Up Office Visit Note    History of Present Illness:  Ray Washington is a 73 y.o. male who presents to the clinic for Follow-up. CAF- He seems doing well, no complaints, no bleeding , no SOB, no palpitations HR is 68 on Toprol xl 25 mg and Xarelto 20 mg    The following portions of the patient's history were reviewed and updated as appropriate: allergies, current medications, past family history, past medical history, past social history, past surgical history, and problem list.    Medications:  metoprolol succinate XL  rivaroxaban  rosuvastatin    Subjective  No Known Allergies     Past Medical History:   Diagnosis Date    Actinic keratosis     Benign prostatic hyperplasia     UNSPECIFIED WHETHER LOWER URINARY TRACT SYMPTOMS PRESENT    Body mass index (BMI) of 25.0 to 25.9 in adult     Corneal abrasion     History of basal cell carcinoma (BCC) excision     Low back pain     Mixed hyperlipidemia     New onset a-fib     Primary osteoarthritis of left hip     Right epiphora     Tobacco abuse     Watering of eye        Past Surgical History:   Procedure Laterality Date    ARTHROPLASTY      LEFT TOTAL HIP    COLONOSCOPY         Family History   Problem Relation Age of Onset    Hypertension Mother     Diabetes Father     Hypertension Father     Diabetes Sister     Hypertension Brother     Diabetes Brother         Social History     Socioeconomic History    Marital status:     Number of children: 1    Highest education level: 12th grade   Tobacco Use    Smoking status: Every Day     Packs/day: 0.25     Years: 40.00     Additional pack years: 0.00     Total pack years: 10.00     Types: Cigarettes    Smokeless tobacco: Never   Vaping Use    Vaping Use: Never used   Substance and Sexual Activity    Alcohol use: Yes    Drug use: Never     "Sexual activity: Defer       Review of Systems   Constitutional: Negative.    HENT: Negative.     Respiratory: Negative.     Cardiovascular: Negative.    Endocrine: Negative.    Genitourinary: Negative.    Musculoskeletal: Negative.    Skin: Negative.    Allergic/Immunologic: Negative.    Neurological: Negative.    Hematological: Negative.    Psychiatric/Behavioral: Negative.       Cardiovascular Procedures    ECHO/MUGA:  STRESS TESTS:   CARDIAC CATH:   DEVICES:   HOLTER:   CT/MRI:   VASCULAR:   CARDIOTHORACIC:     Objective  Vitals:    01/25/24 1401   BP: 130/70   BP Location: Right arm   Patient Position: Sitting   Cuff Size: Adult   Pulse: 68   Resp: 16   Temp: 98 °F (36.7 °C)   TempSrc: Infrared   SpO2: 99%   Weight: 88.9 kg (196 lb)   Height: 185.4 cm (73\")   PainSc: 0-No pain     Body mass index is 25.86 kg/m².     Physical Exam  Vitals reviewed.   Constitutional:       Appearance: Healthy appearance. Not in distress.   Eyes:      Pupils: Pupils are equal, round, and reactive to light.   HENT:    Mouth/Throat:      Pharynx: Oropharynx is clear.   Neck:      Thyroid: Thyroid normal.      Vascular: No JVR. JVD normal.   Pulmonary:      Effort: Pulmonary effort is normal.      Breath sounds: Normal breath sounds. No wheezing. No rhonchi. No rales.   Chest:      Chest wall: Not tender to palpatation.   Cardiovascular:      PMI at left midclavicular line. Normal rate. Regular rhythm. Normal S1. Normal S2.       Murmurs: There is no murmur.      No gallop.  No click. No rub.   Pulses:     Intact distal pulses.      Carotid: 3+ bilaterally.     Radial: 3+ bilaterally.     Femoral: 3+ bilaterally.     Dorsalis pedis: 3+ bilaterally.     Posterior tibial: 3+ bilaterally.  Edema:     Peripheral edema absent.   Abdominal:      General: Bowel sounds are normal.      Palpations: Abdomen is soft.      Tenderness: There is no abdominal tenderness.   Musculoskeletal: Normal range of motion.         General: No tenderness.     "  Cervical back: Normal range of motion and neck supple. Skin:     General: Skin is warm and dry.   Neurological:      General: No focal deficit present.      Mental Status: Alert and oriented to person, place and time.        Diagnostic Data  Procedures    Assessment and Plan  Diagnoses and all orders for this visit:    Atrial fibrillation, chronic-rate control on Toprol xl 25 mg and Xarelto 20 mg, no complaints    Hypercholesterolemia- On Crestor 5 mg need lipid     Tobacco use- still smokling    Other orders  -     metoprolol succinate XL (TOPROL-XL) 25 MG 24 hr tablet; Take 1 tablet by mouth Daily.  -     rivaroxaban (XARELTO) 20 MG tablet; Take 1 tablet by mouth Daily With Dinner.  -     rosuvastatin (CRESTOR) 5 MG tablet; Take 1 tablet by mouth Daily.         Return in about 1 year (around 1/25/2025) for Recheck with Dr. Hernadez.    Rony Hernadez MD  01/25/2024

## 2025-01-28 ENCOUNTER — OFFICE VISIT (OUTPATIENT)
Dept: CARDIOLOGY | Facility: CLINIC | Age: 75
End: 2025-01-28
Payer: MEDICARE

## 2025-01-28 VITALS
SYSTOLIC BLOOD PRESSURE: 142 MMHG | DIASTOLIC BLOOD PRESSURE: 88 MMHG | OXYGEN SATURATION: 99 % | HEART RATE: 97 BPM | HEIGHT: 73 IN | BODY MASS INDEX: 25.98 KG/M2 | WEIGHT: 196 LBS | RESPIRATION RATE: 18 BRPM | TEMPERATURE: 97 F

## 2025-01-28 DIAGNOSIS — Z72.0 TOBACCO USE: ICD-10-CM

## 2025-01-28 DIAGNOSIS — I48.20 ATRIAL FIBRILLATION, CHRONIC: Primary | ICD-10-CM

## 2025-01-28 DIAGNOSIS — I45.2 BIFASCICULAR BLOCK: ICD-10-CM

## 2025-01-28 DIAGNOSIS — E78.00 HYPERCHOLESTEROLEMIA: ICD-10-CM

## 2025-01-28 PROCEDURE — 93000 ELECTROCARDIOGRAM COMPLETE: CPT | Performed by: INTERNAL MEDICINE

## 2025-01-28 PROCEDURE — 99214 OFFICE O/P EST MOD 30 MIN: CPT | Performed by: INTERNAL MEDICINE

## 2025-01-28 RX ORDER — ROSUVASTATIN CALCIUM 5 MG/1
5 TABLET, COATED ORAL DAILY
Qty: 90 TABLET | Refills: 3 | Status: SHIPPED | OUTPATIENT
Start: 2025-01-28

## 2025-01-28 RX ORDER — METOPROLOL SUCCINATE 50 MG/1
50 TABLET, EXTENDED RELEASE ORAL DAILY
Qty: 90 TABLET | Refills: 3 | Status: SHIPPED | OUTPATIENT
Start: 2025-01-28

## 2025-01-28 NOTE — PROGRESS NOTES
[facility] [place]  [facility]  [address] [place] [address]  Dept: [phone]  Dept Fax: [phone]    Ray Washington  1950    Follow Up Office Visit Note    History of Present Illness:  Ray Washington is a 74 y.o. male who presents to the clinic for Follow-up. CAF- rate control, on Toprol xl 25 mg and also Xarelto 20 mg,  EKG AF HR is 86. Will increase Toprol xl to 50 mg, BP is 140.80 might need a second med    The following portions of the patient's history were reviewed and updated as appropriate: allergies, current medications, past family history, past medical history, past social history, past surgical history, and problem list.    Medications:  metoprolol succinate XL  rivaroxaban  rosuvastatin    Subjective  No Known Allergies     Past Medical History:   Diagnosis Date    Actinic keratosis     Benign prostatic hyperplasia     UNSPECIFIED WHETHER LOWER URINARY TRACT SYMPTOMS PRESENT    Body mass index (BMI) of 25.0 to 25.9 in adult     Corneal abrasion     History of basal cell carcinoma (BCC) excision     Low back pain     Mixed hyperlipidemia     New onset a-fib     Primary osteoarthritis of left hip     Right epiphora     Tobacco abuse     Watering of eye        Past Surgical History:   Procedure Laterality Date    ARTHROPLASTY      LEFT TOTAL HIP    COLONOSCOPY         Family History   Problem Relation Age of Onset    Hypertension Mother     Diabetes Father     Hypertension Father     Diabetes Sister     Hypertension Brother     Diabetes Brother         Social History     Socioeconomic History    Marital status:     Number of children: 1    Highest education level: 12th grade   Tobacco Use    Smoking status: Every Day     Current packs/day: 0.25     Average packs/day: 0.3 packs/day for 40.0 years (10.0 ttl pk-yrs)     Types: Cigarettes    Smokeless tobacco: Never   Vaping Use    Vaping status: Never Used   Substance and Sexual Activity    Alcohol  "use: Yes    Drug use: Never    Sexual activity: Defer       Review of Systems   Constitutional: Negative.    HENT: Negative.     Respiratory: Negative.     Cardiovascular: Negative.    Endocrine: Negative.    Genitourinary: Negative.    Musculoskeletal: Negative.    Skin: Negative.    Allergic/Immunologic: Negative.    Neurological: Negative.    Hematological: Negative.    Psychiatric/Behavioral: Negative.         Cardiovascular Procedures    ECHO/MUGA:  STRESS TESTS:   CARDIAC CATH:   DEVICES:   HOLTER:   CT/MRI:   VASCULAR:   CARDIOTHORACIC:     Objective  Vitals:    01/28/25 1357   BP: 142/88   BP Location: Right arm   Patient Position: Lying   Cuff Size: Adult   Pulse: 97   Resp: 18   Temp: 97 °F (36.1 °C)   TempSrc: Infrared   SpO2: 99%   Weight: 88.9 kg (196 lb)   Height: 185.4 cm (73\")   PainSc: 0-No pain     Body mass index is 25.86 kg/m².     Physical Exam  Vitals reviewed.   Constitutional:       Appearance: Healthy appearance. Not in distress.   Eyes:      Pupils: Pupils are equal, round, and reactive to light.   HENT:    Mouth/Throat:      Pharynx: Oropharynx is clear.   Neck:      Thyroid: Thyroid normal.      Vascular: No JVR. JVD normal.   Pulmonary:      Effort: Pulmonary effort is normal.      Breath sounds: Normal breath sounds. No wheezing. No rhonchi. No rales.   Chest:      Chest wall: Not tender to palpatation.   Cardiovascular:      PMI at left midclavicular line. Normal rate. Irregularly irregular rhythm. Normal S1. Normal S2.       Murmurs: There is no murmur.      No gallop.  No click. No rub.   Pulses:     Intact distal pulses.      Carotid: 3+ bilaterally.     Radial: 3+ bilaterally.     Femoral: 3+ bilaterally.     Dorsalis pedis: 3+ bilaterally.     Posterior tibial: 3+ bilaterally.  Edema:     Peripheral edema absent.   Abdominal:      General: Bowel sounds are normal.      Palpations: Abdomen is soft.      Tenderness: There is no abdominal tenderness.   Musculoskeletal: Normal range " of motion.         General: No tenderness.      Cervical back: Normal range of motion and neck supple. Skin:     General: Skin is warm and dry.   Neurological:      General: No focal deficit present.      Mental Status: Alert and oriented to person, place and time.        Diagnostic Data    ECG 12 Lead    Date/Time: 1/28/2025 2:18 PM  Performed by: Rony Hernadez MD    Authorized by: Rony Hernadez MD  Comparison: compared with previous ECG from 7/25/2023  Similar to previous ECG  Rhythm: atrial fibrillation  Rate: normal  BPM: 86  Conduction: right bundle branch block, left anterior fascicular block and bifascicular block  QRS axis: left    Clinical impression: abnormal EKG        Assessment and Plan  Diagnoses and all orders for this visit:    Atrial fibrillation, chronic- Rate is 86, will increase Toprol xl to 50 mg, carefully he also has bifascicular block    Tobacco use- Advised to quit,  Hypercholesterolemia - on crestor, we need a Lipid profile  Hypertension- BP is 140.80 will increase the Toprol xl to 50 mg, will come in 1 month to recheck Bp and also Lipids.     Other orders  -     metoprolol succinate XL (TOPROL-XL) 50 MG 24 hr tablet; Take 1 tablet by mouth Daily.  -     rivaroxaban (XARELTO) 20 MG tablet; Take 1 tablet by mouth Daily With Dinner.  -     rosuvastatin (CRESTOR) 5 MG tablet; Take 1 tablet by mouth Daily.         Return in about 1 year (around 1/28/2026) for Recheck with Dr. Hernadez.    Rony Hernadez MD  01/28/2025